# Patient Record
Sex: FEMALE | Race: OTHER | NOT HISPANIC OR LATINO
[De-identification: names, ages, dates, MRNs, and addresses within clinical notes are randomized per-mention and may not be internally consistent; named-entity substitution may affect disease eponyms.]

---

## 2019-12-06 PROBLEM — Z00.00 ENCOUNTER FOR PREVENTIVE HEALTH EXAMINATION: Status: ACTIVE | Noted: 2019-12-06

## 2019-12-11 ENCOUNTER — APPOINTMENT (OUTPATIENT)
Dept: PULMONOLOGY | Facility: CLINIC | Age: 54
End: 2019-12-11
Payer: COMMERCIAL

## 2019-12-11 ENCOUNTER — TRANSCRIPTION ENCOUNTER (OUTPATIENT)
Age: 54
End: 2019-12-11

## 2019-12-11 ENCOUNTER — NON-APPOINTMENT (OUTPATIENT)
Age: 54
End: 2019-12-11

## 2019-12-11 ENCOUNTER — RESULT REVIEW (OUTPATIENT)
Age: 54
End: 2019-12-11

## 2019-12-11 VITALS
WEIGHT: 124 LBS | DIASTOLIC BLOOD PRESSURE: 70 MMHG | BODY MASS INDEX: 24.35 KG/M2 | OXYGEN SATURATION: 98 % | HEART RATE: 83 BPM | SYSTOLIC BLOOD PRESSURE: 112 MMHG | HEIGHT: 60 IN

## 2019-12-11 DIAGNOSIS — J45.41 MODERATE PERSISTENT ASTHMA WITH (ACUTE) EXACERBATION: ICD-10-CM

## 2019-12-11 DIAGNOSIS — Z83.3 FAMILY HISTORY OF DIABETES MELLITUS: ICD-10-CM

## 2019-12-11 DIAGNOSIS — R09.81 NASAL CONGESTION: ICD-10-CM

## 2019-12-11 DIAGNOSIS — Z87.39 PERSONAL HISTORY OF OTHER DISEASES OF THE MUSCULOSKELETAL SYSTEM AND CONNECTIVE TISSUE: ICD-10-CM

## 2019-12-11 DIAGNOSIS — K21.0 GASTRO-ESOPHAGEAL REFLUX DISEASE WITH ESOPHAGITIS: ICD-10-CM

## 2019-12-11 DIAGNOSIS — J30.89 OTHER ALLERGIC RHINITIS: ICD-10-CM

## 2019-12-11 DIAGNOSIS — Z87.891 PERSONAL HISTORY OF NICOTINE DEPENDENCE: ICD-10-CM

## 2019-12-11 DIAGNOSIS — Z78.9 OTHER SPECIFIED HEALTH STATUS: ICD-10-CM

## 2019-12-11 DIAGNOSIS — E61.1 IRON DEFICIENCY: ICD-10-CM

## 2019-12-11 DIAGNOSIS — Z87.09 PERSONAL HISTORY OF OTHER DISEASES OF THE RESPIRATORY SYSTEM: ICD-10-CM

## 2019-12-11 DIAGNOSIS — G25.81 RESTLESS LEGS SYNDROME: ICD-10-CM

## 2019-12-11 LAB — EPWORTH SCORE: 6

## 2019-12-11 PROCEDURE — 94010 BREATHING CAPACITY TEST: CPT

## 2019-12-11 PROCEDURE — 99245 OFF/OP CONSLTJ NEW/EST HI 55: CPT | Mod: 25

## 2019-12-11 PROCEDURE — 95012 NITRIC OXIDE EXP GAS DETER: CPT

## 2019-12-11 RX ORDER — FLUTICASONE FUROATE AND VILANTEROL TRIFENATATE 100; 25 UG/1; UG/1
100-25 POWDER RESPIRATORY (INHALATION)
Refills: 0 | Status: ACTIVE | COMMUNITY

## 2019-12-11 RX ORDER — MONTELUKAST SODIUM 10 MG/1
10 TABLET, FILM COATED ORAL
Refills: 0 | Status: ACTIVE | COMMUNITY

## 2019-12-11 RX ORDER — AMITRIPTYLINE HYDROCHLORIDE 25 MG/1
25 TABLET ORAL
Refills: 0 | Status: ACTIVE | COMMUNITY

## 2019-12-11 NOTE — ASSESSMENT
[FreeTextEntry1] : above was discussed at length with the patient who has an excellent understanding of the issues

## 2019-12-11 NOTE — PHYSICAL EXAM
[General Appearance - In No Acute Distress] : no acute distress [Low Lying Soft Palate] : low lying soft palate [Elongated Uvula] : elongated uvula [Enlarged Base of the Tongue] : enlargement of the base of the tongue [Erythema] : erythema of the pharynx [IV] : IV [Neck Appearance] : the appearance of the neck was normal [] : the neck was supple [Heart Rate And Rhythm] : heart rate and rhythm were normal [Heart Sounds] : normal S1 and S2 [Respiration, Rhythm And Depth] : normal respiratory rhythm and effort [Abdomen Soft] : soft [Abnormal Walk] : normal gait [Nail Clubbing] : no clubbing of the fingernails [Cyanosis, Localized] : no localized cyanosis [Skin Color & Pigmentation] : normal skin color and pigmentation [Skin Turgor] : normal skin turgor [Cranial Nerves] : cranial nerves 2-12 were intact [Deep Tendon Reflexes (DTR)] : deep tendon reflexes were 2+ and symmetric [Oriented To Time, Place, And Person] : oriented to person, place, and time [Memory Recent] : recent memory was not impaired [FreeTextEntry1] : globally diminished breath sounds [FreeTextEntry2] : no edema

## 2019-12-11 NOTE — CONSULT LETTER
[FreeTextEntry1] : Thank you for allowing me to consult on GEORGE MCKOY  for asthma.  Please see my note below.\par \par   [FreeTextEntry3] : Thank you very much for allowing me to consult on your patient.  If you have any questions, please do not hesitate to contact me.\par \par Sincerely,\par \par Ventura Araujo MD\par Pulmonary and Sleep Medicine\par [facility] [DrIsabelle  ___] : Dr. GOLD [DrIsabelle ___] : Dr. GOLD

## 2019-12-11 NOTE — DISCUSSION/SUMMARY
[FreeTextEntry1] : FEV1 [2.55]L  [110%]% Predicted FEV1/FVC [89]\par FeNO= 10\par chest x-ray PA/lat +hyperinflatedl NAD

## 2019-12-11 NOTE — HISTORY OF PRESENT ILLNESS
[Difficulty Breathing During Exertion] : improved dyspnea on exertion [Feelings Of Weakness On Exertion] : improved exercise intolerance [Cough] : stable coughing [Wheezing] : improved wheezing [Regional Soft Tissue Swelling Both Lower Extremities] : denies lower extremity edema [Chest Pain Or Discomfort] : improved chest pain [Fever] : denies fever [3  -  Moderate] : 3, moderate [Class II - Mild Symptoms and Slight Limitations] : II [Former] : is a former smoker [___ Year Quit] : ~He/She~ quit smoking in [unfilled] [Daytime Somnolence] : daytime somnolence [ESS: ___] : ESS score [unfilled] [Awakes Unrefreshed] : awakening unrefreshed [Awakening With Dry Mouth] : awakening with dry mouth [DMS] : DMS [Lower Extremity Discomfort] : lower extremity discomfort in evening or at bedtime [Wt Gain ___ Lbs] : no recent weight gain [Wt Loss ___ Lbs] : no recent weight loss [Oxygen] : the patient uses no supplemental oxygen [FreeTextEntry1] : I was asked to consult on this patient by Dr. Sepulveda for persistent asthma\par The patient is a 53 yo W with a past medical history significant for asthma as a child which came back in her 20s and initially diagnosed as cold-induced asthma. She also has GERD with esophagitis on and off for the past 10 years. It seems every late fall she gets sick with a asthma exacerbation shortness of breath and coughing which precipitates more reflux to the point where she is burning in her ears. She has known allergies to dust ragweed mold and her dog. + Sinus pressure and heaviness When she became sick approximately 2-3 months ago she was wheezing and very tight more recently however she is still waking up coughing especially in the early AM (approx 4AM) to the point where she contemplated going to the ER. 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 She has frequent gas and burping which relieved some of her epigastric discomfort. She has been on Dexilant and Prevacid for her GERD. She has been on Singulair for years Breo x2 years and InCruz x1 year\par In addition to that she has painful cramps that feel like a tight knot in her legs and thighs that forces her to either move her legs stretched or get up. These cramps occur in the movies car or while sitting. Her  says she snores. she has not slept well in years. she was diagnosed with fibromyalgia approximately 25 years ago she was initially prescribed Elavil and Naprosyn which work  within 2 years ago  it flared up again. She tried Lyrica and gabapentin but the side effects were prohibitive. She went back to exercise  and started taking her Elavil and Naprosyn and her symptoms have improved

## 2019-12-11 NOTE — REASON FOR VISIT
[Consultation] : a consultation visit [Asthma] : asthma [Chest Pain] : chest Pain [Cough] : cough [Shortness of Breath] : shortness of Breath [FreeTextEntry2] : Asthma

## 2019-12-11 NOTE — REVIEW OF SYSTEMS
[Fatigue] : fatigue [Nasal Congestion] : nasal congestion [Postnasal Drip] : postnasal drip [Sinus Problems] : sinus problems [Dry Mouth] : dry mouth [Cough] : cough [Sputum] : sputum  [Dyspnea] : dyspnea [Chest Tightness] : chest tightness [Pleuritic Pain] : pleuritic pain [Frequent URIs] : frequent upper respiratory infections [Wheezing] : wheezing [Chest Discomfort] : chest discomfort [PND] : PND [Nasal Discharge] : nasal discharge [As Noted in HPI] : as noted in HPI [Heartburn] : heartburn [Reflux] : reflux [Indigestion] : indigestion [Difficulty Maintaining Sleep] : difficulty maintaining sleep [Snoring] : snoring [Nonrestorative Sleep] : nonrestorative sleep [Awakes With Headache] : awakes with a headache [Awakes With Dry Mouth] : awakes with dry mouth [Negative] : Pulmonary Hypertension

## 2019-12-16 LAB
BASOPHILS # BLD AUTO: 0.05 K/UL
BASOPHILS NFR BLD AUTO: 0.6 %
EOSINOPHIL # BLD AUTO: 0.13 K/UL
EOSINOPHIL NFR BLD AUTO: 1.5 %
FERRITIN SERPL-MCNC: 56 NG/ML
HCT VFR BLD CALC: 43.2 %
HGB BLD-MCNC: 13.7 G/DL
IMM GRANULOCYTES NFR BLD AUTO: 0.2 %
IRON SATN MFR SERPL: 18 %
IRON SERPL-MCNC: 66 UG/DL
LYMPHOCYTES # BLD AUTO: 2.91 K/UL
LYMPHOCYTES NFR BLD AUTO: 32.7 %
MAN DIFF?: NORMAL
MCHC RBC-ENTMCNC: 30.9 PG
MCHC RBC-ENTMCNC: 31.7 GM/DL
MCV RBC AUTO: 97.5 FL
MONOCYTES # BLD AUTO: 0.53 K/UL
MONOCYTES NFR BLD AUTO: 5.9 %
NEUTROPHILS # BLD AUTO: 5.27 K/UL
NEUTROPHILS NFR BLD AUTO: 59.1 %
PLATELET # BLD AUTO: 325 K/UL
RBC # BLD: 4.43 M/UL
RBC # FLD: 13.2 %
TIBC SERPL-MCNC: 367 UG/DL
TOTAL IGE SMQN RAST: 69 KU/L
UIBC SERPL-MCNC: 301 UG/DL
WBC # FLD AUTO: 8.91 K/UL

## 2020-01-31 ENCOUNTER — APPOINTMENT (OUTPATIENT)
Dept: PULMONOLOGY | Facility: CLINIC | Age: 55
End: 2020-01-31

## 2020-01-31 ENCOUNTER — APPOINTMENT (OUTPATIENT)
Dept: GASTROENTEROLOGY | Facility: CLINIC | Age: 55
End: 2020-01-31
Payer: COMMERCIAL

## 2020-01-31 VITALS
WEIGHT: 125 LBS | HEART RATE: 80 BPM | HEIGHT: 60 IN | DIASTOLIC BLOOD PRESSURE: 80 MMHG | BODY MASS INDEX: 24.54 KG/M2 | SYSTOLIC BLOOD PRESSURE: 110 MMHG

## 2020-01-31 DIAGNOSIS — K31.84 GASTROPARESIS: ICD-10-CM

## 2020-01-31 DIAGNOSIS — Z12.11 ENCOUNTER FOR SCREENING FOR MALIGNANT NEOPLASM OF COLON: ICD-10-CM

## 2020-01-31 PROCEDURE — 99244 OFF/OP CNSLTJ NEW/EST MOD 40: CPT

## 2020-01-31 RX ORDER — UMECLIDINIUM 62.5 UG/1
AEROSOL, POWDER ORAL
Refills: 0 | Status: DISCONTINUED | COMMUNITY
End: 2020-01-31

## 2020-01-31 NOTE — HISTORY OF PRESENT ILLNESS
[FreeTextEntry1] : Ms. Oneil is a pleasant 54F h/o asthma, anal fissures, hemorrhoidectomy who is seen at the request of Dr. Araujo for evaluation for GERD.\par \par She has had reflux characterized by heartburn and regurgitation for at least 10-15 years.  Has been questioned whether this has contributed to her asthma symptoms, which she tends to experience starting in September.  She has been taking pantoprazole daily, occasionally takes nexium at night if her symptoms worsen, is unsure if this is controlling her symptoms well.  Has had "difficulty" swallowing previously, although no overt food impactions, no odynophagia.\bisi lei Had a previous endoscopy in 2007 (report in [employer]) stating she had food in her stomach, she never had a gastric emptying study.  It was also noted she had a hiatal hernia.  As per her report, she had an X-ray esophagram that was unremarkable.\bisi lei Had a colonoscopy 3 years ago, had a poor prep, was told to have a repeat in 3 years.\bisi lei Has long term mild chronic constipation, does not take anything for this.\par \bisi Eats a high fiber diet.\bisi \bisi Works out regularly, works as a workout instructor and dental hygienist.\bisi \bisi Does not smoke or drink.\par \par No family history of colorectal or other GI malignancies.

## 2020-01-31 NOTE — ASSESSMENT
[FreeTextEntry1] : Will plan on an upper endoscopy for GERD.  Explained risks/benefits/alternatives including not limited to bleeding, infection, perforation, missed lesions, anesthesia complications.  Patient understands and agrees, all questions answered.\par \par Will plan on a colonoscopy for screening.  Explained risks/benefits/alternatives including not limited to bleeding, infection, perforation, missed lesions, anesthesia complications.  Patient understands and agrees, all questions answered.  Will use a split dose miralax/gatorade prep with clears the day prior.\par \par Will use 1 bottle of Mg Citrate prior to her above prep given her previously poor prep.\par \par Will order a gastric emptying study.  If she has gastroparesis, this could lead to worsening reflux.\par \par Depending on the above workup, she may need motility testing vs. pH/impedence testing with Dr. Vega.\par \par Thank you for referring Ms. MCKOY.  Please do not hesitate to call to further discuss his/her care.\par \par Note faxed to requesting MD.\par \par

## 2020-03-09 ENCOUNTER — RESULT REVIEW (OUTPATIENT)
Age: 55
End: 2020-03-09

## 2020-03-10 ENCOUNTER — APPOINTMENT (OUTPATIENT)
Dept: GASTROENTEROLOGY | Facility: HOSPITAL | Age: 55
End: 2020-03-10

## 2020-11-17 ENCOUNTER — RESULT REVIEW (OUTPATIENT)
Age: 55
End: 2020-11-17

## 2025-07-24 ENCOUNTER — TRANSCRIPTION ENCOUNTER (OUTPATIENT)
Age: 60
End: 2025-07-24

## 2025-07-29 ENCOUNTER — TRANSCRIPTION ENCOUNTER (OUTPATIENT)
Age: 60
End: 2025-07-29